# Patient Record
Sex: FEMALE | URBAN - NONMETROPOLITAN AREA
[De-identification: names, ages, dates, MRNs, and addresses within clinical notes are randomized per-mention and may not be internally consistent; named-entity substitution may affect disease eponyms.]

---

## 2020-06-18 ENCOUNTER — APPOINTMENT (RX ONLY)
Dept: URBAN - NONMETROPOLITAN AREA CLINIC 6 | Facility: CLINIC | Age: 60
Setting detail: DERMATOLOGY
End: 2020-06-18

## 2020-06-18 DIAGNOSIS — L40.0 PSORIASIS VULGARIS: ICD-10-CM

## 2020-06-18 PROCEDURE — ? TREATMENT REGIMEN

## 2020-06-18 PROCEDURE — ? COUNSELING

## 2020-06-18 PROCEDURE — 99202 OFFICE O/P NEW SF 15 MIN: CPT

## 2020-06-18 PROCEDURE — ? REFERRAL

## 2020-06-18 ASSESSMENT — BSA PSORIASIS: % BODY COVERED IN PSORIASIS: 10

## 2020-06-18 ASSESSMENT — LOCATION ZONE DERM
LOCATION ZONE: ARM
LOCATION ZONE: VULVA

## 2020-06-18 ASSESSMENT — LOCATION DETAILED DESCRIPTION DERM
LOCATION DETAILED: MONS PUBIS
LOCATION DETAILED: LEFT ELBOW

## 2020-06-18 ASSESSMENT — LOCATION SIMPLE DESCRIPTION DERM
LOCATION SIMPLE: LEFT ELBOW
LOCATION SIMPLE: GROIN

## 2020-06-18 NOTE — PROCEDURE: TREATMENT REGIMEN
Action 4: Continue
Plan: Encouraged to discontinue scented washes, especially when cleansing genital area. Begin using fragrance free products\\nWill refer to Rheumatologist Dr. Stewart for joint ache concerns\\nRecommended she establish care with a PCP to address any potential underlying health conditions
Samples Given: Duobrii, Sensitive skin handout, Vanicream bar soap, Cetaphil cream
Otc Regimen: Cetaphil cream BID PRN
Initiate Regimen: Duobrii once daily to affected areas on left elbow up to 2 months
Detail Level: Zone
Continue Regimen: Triamcinolone 0.1% cream to affected areas on pubic area 2-3 times per week

## 2020-06-18 NOTE — HPI: RASH (PSORIASIS)
How Severe Is Your Psoriasis?: moderate
Do You Have A Family History Of Psoriasis?: yes
Is This A New Presentation, Or A Follow-Up?: Psoriasis
Additional History: Her PCP Maxine Gutiérrez who has since left and Gynecologist Dr. Yepez  have recommended she be evaluated by a dermatologist for concern of psoriasis on pubic area and left elbow. She has been applying Triamcinolone cream 2-3 days per week but she feels it makes her arm and hand tingle. Patient also notes significant joint aches, specifically on the left side of her body. 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